# Patient Record
Sex: MALE | Race: WHITE
[De-identification: names, ages, dates, MRNs, and addresses within clinical notes are randomized per-mention and may not be internally consistent; named-entity substitution may affect disease eponyms.]

---

## 2018-07-22 ENCOUNTER — HOSPITAL ENCOUNTER (EMERGENCY)
Dept: HOSPITAL 68 - ERH | Age: 43
End: 2018-07-22
Payer: COMMERCIAL

## 2018-07-22 VITALS — BODY MASS INDEX: 30.46 KG/M2 | WEIGHT: 245 LBS | HEIGHT: 75 IN

## 2018-07-22 VITALS — SYSTOLIC BLOOD PRESSURE: 128 MMHG | DIASTOLIC BLOOD PRESSURE: 87 MMHG

## 2018-07-22 DIAGNOSIS — M54.5: Primary | ICD-10-CM

## 2018-07-22 NOTE — ED GENERAL ADULT
History of Present Illness
 
General
Chief Complaint: Low Back Pain/Injury
Stated Complaint: BACK PAIN
Source: patient
Exam Limitations: no limitations
 
Vital Signs & Intake/Output
Vital Signs & Intake/Output
 Vital Signs
 
 
Date Time Temp Pulse Resp B/P B/P Pulse O2 O2 Flow FiO2
 
     Mean Ox Delivery Rate 
 
 1453 99.8 100 20 128/87  100 Room Air  
 
 1438       Room Air  
 
 1432 97.8 114 18 145/94  95 Room Air  
 
 
 
Allergies
Coded Allergies:
NO KNOWN ALLERGIES (10/02/15)
 
Reconcile Medications
Ascorbic Acid (Vitamin C) (Unknown Strength) TABLET   (Unknown Dose) PO DAILY 
SUPPLEMENT  (Reported)
Cyclobenzaprine HCl 10 MG TABLET   1 TAB PO TID PRN pain 
Escitalopram Oxalate 20 MG TABLET   1 TAB PO DAILY MENTAL HEALTH  (Reported)
[HAMMER] 2 CAP PO DAILY MALE ENHANCEMENT SUPPLEMENT  (Reported)
Ibuprofen 800 MG TABLET   1 TAB PO TID PRN pain 
Methylprednisolone. (Medrol) 4 MG TAB.DS.PK   1 DP PO AD back pain 
     6 on day 1 then reduce by one tablet daily until gone
Multivitamin/Iron/Folic Acid (Centrum Adults Tablet) 18 MG IRON-400 MCG TABLET  
1 TAB PO DAILY SUPPLEMENT  (Reported)
Potassium (Unknown Strength) TABLET   (Unknown Dose) PO DAILY SUPPLEMENT  (
Reported)
Vitamin E Mixed (Vitamin E) (Unknown Strength) TABLET   (Unknown Dose) PO DAILY 
SUPPLEMENT  (Reported)
 
Triage Note:
PT STATES HE PULLED HIS WASHER OUT ABOUT A WEEK
AGO AND SINCE HAS HAD BACK PAIN.  PT STATES THIS
BACK PAIN IS THE WORSE THAT HE HAS EVER HAD.  PT
COMPLAINED THAT HIS ENTIRE BACK IS NUMB AND HIS
FEET DON'T WANT TO WORK. PT DENIES BOWEL OR
BLADDER DISFUCTION.
Triage Nurses Notes Reviewed? yes
Onset: Abrupt
Duration: week(s): (2), constant, continues in ED, getting worse
Timing: single episode today
Injury Environment: home
Severity: moderate, severe
Severity Numbers: 9
No Modifying Factors: none
Modifying Factors:
Worsens With: movement. 
HPI:
43-year-old male history of depression, substance abuse, presents for evaluation
of low back pain.  Patient reports symptoms started about 2 weeks ago.  The pain
started initially after he had pulled a washer.  There is no direct trauma.  The
pain is located on both sides of his lower back and does not radiate.  He 
reports associated numbness and tingling throughout his entire lower back and 
bilateral lower extremities.  He also feels like his bilateral lower extremities
are weak.  He denies bowel or bladder dysfunction.  He has no fever chest pain 
shortness of breath.  Patient reports he has had similar symptoms in the past 
but never this severe or persistent.  He's been taking Tylenol without 
improvement.  He denies IV drug use.  No history of malignancy.
(Jayson Goodwin)
 
Past History
 
Travel History
Traveled to [place] past 21 day No
 
Medical History
Any Pertinent Medical History? see below for history
Neurological: NONE (Word finding difficulty), CVA
EENT: NONE
Cardiovascular: NONE
Respiratory: NONE
Gastrointestinal: NONE
Hepatic: NONE
Renal: NONE
Musculoskeletal: NONE
Psychiatric: depression, substance abuse
Endocrine: NONE
Blood Disorders: NONE
Cancer(s): NONE
GYN/Reproductive: NONE
Other Medical Hx:
recurrent herpes zoster, varicella at ~11 years old
 
Surgical History
Surgical History: non-contributory
 
Psychosocial History
Who do you live with Significant Other
What is your primary language English
Tobacco Use: Current Daily Use
Daily Tobacco Use Amount/Type: => 5 Cigarettes daily
ETOH Use: occasional use
Illicit Drug Use: denies illicit drug use
 
Family History
Hx Contributory? No
(Jayson Goodwin)
 
Review of Systems
 
Review of Systems
Constitutional:
Reports: no symptoms. 
EENTM:
Reports: no symptoms. 
Respiratory:
Reports: no symptoms. 
Cardiovascular:
Reports: no symptoms. 
GI:
Reports: no symptoms. 
Genitourinary:
Reports: no symptoms. 
Musculoskeletal:
Reports: see HPI, back pain, muscle pain, muscle stiffness. 
Skin:
Reports: no symptoms. 
Neurological/Psychological:
Reports: see HPI, numbness, tingling. 
Hematologic/Endocrine:
Reports: no symptoms. 
Immunologic/Allergic:
Reports: no symptoms. 
All Other Systems: Reviewed and Negative
(Jayson Goodwin)
 
Physical Exam
 
Physical Exam
General Appearance: well developed/nourished, no apparent distress, alert, awake
Head: atraumatic, normal appearance
Eyes:
Bilateral: normal appearance, PERRL, EOMI. 
Ears, Nose, Throat: hearing grossly normal
Neck: normal inspection, supple, full range of motion
Respiratory: normal breath sounds, chest non-tender, no respiratory distress, 
lungs clear
Cardiovascular: regular rate/rhythm, normal peripheral pulses
Peripheral Pulses:
2+ radial (R), 2+ radial (L)
Gastrointestinal: normal bowel sounds, soft, no organomegaly, llq pain to 
palpation 
Rectal: normal exam, normal rectal tone, normal sensory supplies to the perianal
area
Back: normal inspection, normal range of motion
Extremities: normal inspection, normal range of motion, no edema, straight leg 
raised (posiitve bilaterally )
Neurologic/Psych: no motor/sensory deficits, awake, alert, oriented x 3, normal 
gait (with cane ), normal mood/affect, sensory supplies intact the bilateral 
lower extremities no saddle paresthesias
Reflexes:
1+: knee (R), knee (L). 
Skin: intact, normal color, warm/dry
 
Core Measures
ACS in differential dx? No
CVA/TIA Diagnosis: No
Sepsis Present: No
Sepsis Focused Exam Completed? No
(Jayson Goodwin)
 
Progress
Differential Diagnoses
I considered the following diagnoses in my evaluation of the patient: [Muscle 
strain, herniated disc, cauda equina, abscess, malignancy, fracture]
 
Plan of Care:
 Orders
 
 
Procedure Date/time Status
 
CT ABD & PELVIS W/O IV CONTRAS 1432 Active
 
 
Patient is here with low back pain for 2 weeks.  There is no direct trauma the 
pain started after he was moving a heavy washer.  On exam he has no saddle 
paresthesias he has good rectal tone.  Patient is medicated with prednisone and 
Toradol and Flexeril.  A CT scan of abdomen and pelvis was obtained.
 
pt signed out to dr malin pending ct. rx sent for ibuprofen, flexeril and 
medrol dosepack.
Initial ED EKG: none
Hand-Off
   Endorsed To:
Phillip Malin MD
   Endorsed Time: 1604
   Pending: CT
(Jayson Goodwin)
Diagnostic Imaging:
Viewed by Me: CT Scan.  Discussed w/RAD: CT Scan. 
Radiology Impression: PATIENT: JAYLON BAUMAN  MEDICAL RECORD NO: [id number] 
PRESENT AGE: 43  PATIENT ACCOUNT NO: [id number] : 77  LOCATION: [facility] 
ORDERING PHYSICIAN: Jayson CABRAL     SERVICE DATE:  EXAM TYPE: CAT
- CT ABD & PELVIS W/O IV CONTRAS EXAMINATION: CT ABDOMEN AND PELVIS WITHOUT 
CONTRAST CLINICAL INFORMATION: Left lower back and flank pain COMPARISON: None 
TECHNIQUE: Multidetector volumetric imaging was performed from the superior 
aspect of the liver through the pubic symphysis. Sagittal and coronal 
reformatted images were obtained on the technologist's workstation. DLP: 769 mGy
-cm FINDINGS: LUNG BASES: The visualized lung bases are unremarkable. LIVER, 
GALLBLADDER, AND BILIARY TREE: The liver is normal in size, shape, and 
attenuation. No focal hepatic lesion or biliary ductal dilatation is present. 
The gallbladder is unremarkable with no evidence of radiopaque gallstones, 
gallbladder wall thickening, or obvious pericholecystic inflammatory changes. 
PANCREAS: Unremarkable. SPLEEN: Unremarkable. ADRENAL GLANDS: Unremarkable. 
KIDNEYS AND URETERS: The kidneys are normal in size, shape, and attenuation. No 
hydronephrosis, hydroureter, or calculi seen. No perinephric stranding. BLADDER:
Unremarkable. GASTROINTESTINAL TRACT: There is evidence of mild diverticulosis 
of the colon. Small and large bowel is otherwise unremarkable. The appendix is 
unremarkable. The appendix is unremarkable. ABDOMINAL WALL: No significant 
hernia is appreciated. LYMPH NODES: Normal. VASCULAR: Unremarkable. PELVIC 
VISCERA: The prostate gland does not appear enlarged. OSSEOUS STRUCTURES: Is 
mild grade 1 spondylolisthesis at L5-S1. There is bilateral L5 spondylolysis. 
There is mild degenerative disc disease at L5-S1. IMPRESSION: No stone seen. 
Mild colonic diverticulosis. Spondylolysis, spondylolisthesis and mild 
degenerative disc disease at L5-S1. DICTATED BY: Belkys Dickerson MD  DATE/TIME 
DICTATED:18 :RAD.BARNES  DATE/TIME TRANSCRIBED:1619 CONFIDENTIAL, DO NOT COPY WITHOUT APPROPRIATE AUTHORIZATION.  <
Electronically signed in Other Vendor System>                                   
                                                    SIGNED BY: Belkys Dickesron MD [id number]
(Dea GARCIA,Phillip WORTHY)
 
Departure
 
Departure
Disposition: STILL A PATIENT
Condition: Stable
Clinical Impression
Primary Impression: Low back pain
Qualifiers:  Chronicity: acute Back pain laterality: bilateral Sciatica presence
: without sciatica Qualified Code: M54.5 - Low back pain
Referrals:
El GARCIA,Ander Mcwilliams MD,Niraj MUÑIZ (PCP/Family)
 
Additional Instructions:
Take Medrol Dosepak as directed for the full course.  Ibuprofen and milligrams 
every 8 hours with food as needed for pain.  Cyclobenzaprine is a muscle relaxer
neck and also these every 8 hours as needed this because drowsiness.  Follow up 
with her primary care doctor and provided orthopedic specialist as soon as 
possible monitor symptoms return with any concerns.
Departure Forms:
Customer Survey
General Discharge Information
Prescriptions:
Current Visit Scripts
Ibuprofen 1 TAB PO TID PRN pain  
     #30 TAB 
 
Methylprednisolone. (Medrol) 1 DP PO AD  
     #1 DP 
     6 on day 1 then reduce by one tablet daily until gone
 
Cyclobenzaprine HCl 1 TAB PO TID PRN pain  
     #30 TAB 
 
 
(Jayson Goodwin)
 
PA/NP Co-Sign Statement
Statement:
ED Attending supervision documentation-
 
[X] I saw and evaluated the patient. I have also reviewed all the pertinent lab 
results and diagnostic results. I agree with the findings and the plan of care 
as documented in the PA's/NP's documentation. 
 
[X] I have reviewed the ED Record and agree with the PA's/NP's documentation.
 
[] Additions or exceptions (if any) to the PAs/NP's note and plan are 
summarized below:
[]
 
(Dea GARCIA,Phillip WORTHY)
 
Critical Care Note
 
Critical Care Note
Critical Care Time: non-applicable
(Black PA,Jayson)

## 2018-07-22 NOTE — CT SCAN REPORT
EXAMINATION:
CT ABDOMEN AND PELVIS WITHOUT CONTRAST
 
CLINICAL INFORMATION:
Left lower back and flank pain
 
COMPARISON:
None
 
TECHNIQUE:
Multidetector volumetric imaging was performed from the superior aspect of
the liver through the pubic symphysis. Sagittal and coronal reformatted
images were obtained on the technologist's workstation.
 
DLP:
769 mGy-cm
 
FINDINGS:
 
LUNG BASES: The visualized lung bases are unremarkable.
 
LIVER, GALLBLADDER, AND BILIARY TREE: The liver is normal in size, shape, and
attenuation. No focal hepatic lesion or biliary ductal dilatation is present.
The gallbladder is unremarkable with no evidence of radiopaque gallstones,
gallbladder wall thickening, or obvious pericholecystic inflammatory changes.
 
 
PANCREAS: Unremarkable.
 
SPLEEN: Unremarkable.
 
ADRENAL GLANDS: Unremarkable.
 
KIDNEYS AND URETERS: The kidneys are normal in size, shape, and attenuation.
No hydronephrosis, hydroureter, or calculi seen. No perinephric stranding.
 
BLADDER: Unremarkable.
 
GASTROINTESTINAL TRACT: There is evidence of mild diverticulosis of the
colon. Small and large bowel is otherwise unremarkable. The appendix is
unremarkable. The appendix is unremarkable.
 
ABDOMINAL WALL: No significant hernia is appreciated.
 
LYMPH NODES: Normal.
 
VASCULAR: Unremarkable.
 
PELVIC VISCERA: The prostate gland does not appear enlarged.
 
OSSEOUS STRUCTURES: Is mild grade 1 spondylolisthesis at L5-S1. There is
bilateral L5 spondylolysis. There is mild degenerative disc disease at L5-S1.
 
 
IMPRESSION:
No stone seen. Mild colonic diverticulosis. Spondylolysis, spondylolisthesis
and mild degenerative disc disease at L5-S1.